# Patient Record
Sex: MALE | Race: WHITE | Employment: STUDENT | URBAN - METROPOLITAN AREA
[De-identification: names, ages, dates, MRNs, and addresses within clinical notes are randomized per-mention and may not be internally consistent; named-entity substitution may affect disease eponyms.]

---
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
## 2021-02-06 ENCOUNTER — OFFICE VISIT (OUTPATIENT)
Dept: FAMILY MEDICINE CLINIC | Facility: CLINIC | Age: 21
End: 2021-02-06
Payer: COMMERCIAL

## 2021-02-06 VITALS
HEIGHT: 73 IN | BODY MASS INDEX: 28.25 KG/M2 | WEIGHT: 213.19 LBS | SYSTOLIC BLOOD PRESSURE: 102 MMHG | DIASTOLIC BLOOD PRESSURE: 70 MMHG | TEMPERATURE: 98 F | RESPIRATION RATE: 20 BRPM | HEART RATE: 96 BPM

## 2021-02-06 DIAGNOSIS — J32.9 RECURRENT SINUSITIS: ICD-10-CM

## 2021-02-06 DIAGNOSIS — J32.9 CHRONIC SINUSITIS, UNSPECIFIED LOCATION: Primary | ICD-10-CM

## 2021-02-06 PROCEDURE — 3074F SYST BP LT 130 MM HG: CPT | Performed by: FAMILY MEDICINE

## 2021-02-06 PROCEDURE — 3008F BODY MASS INDEX DOCD: CPT | Performed by: FAMILY MEDICINE

## 2021-02-06 PROCEDURE — 99213 OFFICE O/P EST LOW 20 MIN: CPT | Performed by: FAMILY MEDICINE

## 2021-02-06 PROCEDURE — 3078F DIAST BP <80 MM HG: CPT | Performed by: FAMILY MEDICINE

## 2021-02-06 RX ORDER — AMOXICILLIN 500 MG/1
1000 CAPSULE ORAL 2 TIMES DAILY
Qty: 40 CAPSULE | Refills: 0 | Status: SHIPPED | OUTPATIENT
Start: 2021-02-06 | End: 2021-02-16

## 2021-02-06 NOTE — PROGRESS NOTES
Subjective   HPI:   This is a 24year old male coming in for had concerns including Nasal Congestion (started a year ago, took zertec worked a little bit not fully, saw a nose speclist and said he might a deveated septum and might need surgery but wants to few days and plan on surgery for sometime May or June depending on college schedule  Return if symptoms worsen or fail to improve.     Trina Lomas MD, 2/6/2021, 10:30 AM

## 2021-05-13 RX ORDER — CETIRIZINE HYDROCHLORIDE 10 MG/1
10 TABLET ORAL DAILY
COMMUNITY

## 2021-05-13 RX ORDER — ACETAMINOPHEN 500 MG
1000 TABLET ORAL ONCE
Status: CANCELLED | OUTPATIENT
Start: 2021-05-13 | End: 2021-05-13

## 2021-06-04 ENCOUNTER — LAB ENCOUNTER (OUTPATIENT)
Dept: LAB | Age: 21
End: 2021-06-04
Attending: OTOLARYNGOLOGY
Payer: COMMERCIAL

## 2021-06-04 DIAGNOSIS — J34.2 DEVIATED NASAL SEPTUM: ICD-10-CM

## 2021-06-04 NOTE — H&P
[address] Patient Status:  Hospital Outpatient Surgery    2000 MRN [id number]   [facility] SURGERY Attending Oneal Bobby MD   Hosp Day # 0 PCP Apurva Ortiz MD     History of Present Il examination is grossly normal.  No focal neurologic deficit.     Laboratory Data:      Impression and Plan:  Patient Active Problem List:     Mild intermittent asthma     Allergic rhinitis     IBS (irritable bowel syndrome)     Chronic seasonal allergic rhi

## 2021-06-07 ENCOUNTER — APPOINTMENT (OUTPATIENT)
Dept: ULTRASOUND IMAGING | Facility: HOSPITAL | Age: 21
End: 2021-06-07
Attending: PEDIATRICS
Payer: COMMERCIAL

## 2021-06-07 ENCOUNTER — HOSPITAL ENCOUNTER (EMERGENCY)
Facility: HOSPITAL | Age: 21
Discharge: HOME | End: 2021-06-07
Attending: PEDIATRICS
Payer: COMMERCIAL

## 2021-06-07 ENCOUNTER — ANESTHESIA EVENT (OUTPATIENT)
Dept: SURGERY | Facility: HOSPITAL | Age: 21
End: 2021-06-07
Payer: COMMERCIAL

## 2021-06-07 ENCOUNTER — ANESTHESIA (OUTPATIENT)
Dept: SURGERY | Facility: HOSPITAL | Age: 21
End: 2021-06-07
Payer: COMMERCIAL

## 2021-06-07 ENCOUNTER — HOSPITAL ENCOUNTER (OUTPATIENT)
Facility: HOSPITAL | Age: 21
Setting detail: HOSPITAL OUTPATIENT SURGERY
Discharge: HOME | End: 2021-06-07
Attending: OTOLARYNGOLOGY | Admitting: OTOLARYNGOLOGY
Payer: COMMERCIAL

## 2021-06-07 VITALS
TEMPERATURE: 99 F | HEIGHT: 72 IN | DIASTOLIC BLOOD PRESSURE: 99 MMHG | RESPIRATION RATE: 18 BRPM | HEART RATE: 100 BPM | SYSTOLIC BLOOD PRESSURE: 136 MMHG | OXYGEN SATURATION: 97 % | WEIGHT: 218 LBS | BODY MASS INDEX: 29.53 KG/M2

## 2021-06-07 VITALS
DIASTOLIC BLOOD PRESSURE: 84 MMHG | SYSTOLIC BLOOD PRESSURE: 136 MMHG | WEIGHT: 218.25 LBS | RESPIRATION RATE: 12 BRPM | TEMPERATURE: 98 F | OXYGEN SATURATION: 97 % | HEART RATE: 95 BPM | BODY MASS INDEX: 29.56 KG/M2 | HEIGHT: 72 IN

## 2021-06-07 DIAGNOSIS — Z98.890 S/P NASAL SEPTOPLASTY: Primary | ICD-10-CM

## 2021-06-07 DIAGNOSIS — J34.2 DEVIATED NASAL SEPTUM: Primary | ICD-10-CM

## 2021-06-07 DIAGNOSIS — M79.661 RIGHT CALF PAIN: ICD-10-CM

## 2021-06-07 PROCEDURE — 99284 EMERGENCY DEPT VISIT MOD MDM: CPT

## 2021-06-07 PROCEDURE — 09CM8ZZ EXTIRPATION OF MATTER FROM NASAL SEPTUM, VIA NATURAL OR ARTIFICIAL OPENING ENDOSCOPIC: ICD-10-PCS | Performed by: OTOLARYNGOLOGY

## 2021-06-07 PROCEDURE — 93971 EXTREMITY STUDY: CPT | Performed by: PEDIATRICS

## 2021-06-07 RX ORDER — ACETAMINOPHEN 500 MG
1000 TABLET ORAL ONCE AS NEEDED
Status: COMPLETED | OUTPATIENT
Start: 2021-06-07 | End: 2021-06-07

## 2021-06-07 RX ORDER — MIDAZOLAM HYDROCHLORIDE 1 MG/ML
1 INJECTION INTRAMUSCULAR; INTRAVENOUS EVERY 5 MIN PRN
Status: DISCONTINUED | OUTPATIENT
Start: 2021-06-07 | End: 2021-06-07

## 2021-06-07 RX ORDER — CEFADROXIL 500 MG/1
500 CAPSULE ORAL 2 TIMES DAILY
Qty: 14 CAPSULE | Refills: 0 | Status: SHIPPED | OUTPATIENT
Start: 2021-06-07 | End: 2021-06-14

## 2021-06-07 RX ORDER — LABETALOL HYDROCHLORIDE 5 MG/ML
5 INJECTION, SOLUTION INTRAVENOUS EVERY 5 MIN PRN
Status: DISCONTINUED | OUTPATIENT
Start: 2021-06-07 | End: 2021-06-07

## 2021-06-07 RX ORDER — HYDROCODONE BITARTRATE AND ACETAMINOPHEN 5; 325 MG/1; MG/1
2 TABLET ORAL AS NEEDED
Status: DISCONTINUED | OUTPATIENT
Start: 2021-06-07 | End: 2021-06-07

## 2021-06-07 RX ORDER — METOCLOPRAMIDE HYDROCHLORIDE 5 MG/ML
10 INJECTION INTRAMUSCULAR; INTRAVENOUS AS NEEDED
Status: DISCONTINUED | OUTPATIENT
Start: 2021-06-07 | End: 2021-06-07

## 2021-06-07 RX ORDER — ROCURONIUM BROMIDE 10 MG/ML
INJECTION, SOLUTION INTRAVENOUS AS NEEDED
Status: DISCONTINUED | OUTPATIENT
Start: 2021-06-07 | End: 2021-06-07 | Stop reason: SURG

## 2021-06-07 RX ORDER — HYDROCODONE BITARTRATE AND ACETAMINOPHEN 5; 325 MG/1; MG/1
1-2 TABLET ORAL EVERY 4 HOURS PRN
Qty: 15 TABLET | Refills: 0 | Status: SHIPPED | OUTPATIENT
Start: 2021-06-07

## 2021-06-07 RX ORDER — ACETAMINOPHEN 500 MG
1000 TABLET ORAL ONCE
Status: ON HOLD | COMMUNITY
End: 2021-06-07

## 2021-06-07 RX ORDER — SODIUM CHLORIDE, SODIUM LACTATE, POTASSIUM CHLORIDE, CALCIUM CHLORIDE 600; 310; 30; 20 MG/100ML; MG/100ML; MG/100ML; MG/100ML
INJECTION, SOLUTION INTRAVENOUS CONTINUOUS
Status: DISCONTINUED | OUTPATIENT
Start: 2021-06-07 | End: 2021-06-07

## 2021-06-07 RX ORDER — HYDROCODONE BITARTRATE AND ACETAMINOPHEN 5; 325 MG/1; MG/1
1 TABLET ORAL AS NEEDED
Status: DISCONTINUED | OUTPATIENT
Start: 2021-06-07 | End: 2021-06-07

## 2021-06-07 RX ORDER — NALOXONE HYDROCHLORIDE 0.4 MG/ML
80 INJECTION, SOLUTION INTRAMUSCULAR; INTRAVENOUS; SUBCUTANEOUS AS NEEDED
Status: DISCONTINUED | OUTPATIENT
Start: 2021-06-07 | End: 2021-06-07

## 2021-06-07 RX ORDER — MEPERIDINE HYDROCHLORIDE 25 MG/ML
12.5 INJECTION INTRAMUSCULAR; INTRAVENOUS; SUBCUTANEOUS AS NEEDED
Status: DISCONTINUED | OUTPATIENT
Start: 2021-06-07 | End: 2021-06-07

## 2021-06-07 RX ORDER — LIDOCAINE HYDROCHLORIDE AND EPINEPHRINE 10; 10 MG/ML; UG/ML
INJECTION, SOLUTION INFILTRATION; PERINEURAL AS NEEDED
Status: DISCONTINUED | OUTPATIENT
Start: 2021-06-07 | End: 2021-06-07 | Stop reason: HOSPADM

## 2021-06-07 RX ORDER — METOCLOPRAMIDE HYDROCHLORIDE 5 MG/ML
INJECTION INTRAMUSCULAR; INTRAVENOUS AS NEEDED
Status: DISCONTINUED | OUTPATIENT
Start: 2021-06-07 | End: 2021-06-07 | Stop reason: SURG

## 2021-06-07 RX ORDER — GLYCOPYRROLATE 0.2 MG/ML
INJECTION, SOLUTION INTRAMUSCULAR; INTRAVENOUS AS NEEDED
Status: DISCONTINUED | OUTPATIENT
Start: 2021-06-07 | End: 2021-06-07 | Stop reason: SURG

## 2021-06-07 RX ORDER — LIDOCAINE HYDROCHLORIDE 10 MG/ML
INJECTION, SOLUTION EPIDURAL; INFILTRATION; INTRACAUDAL; PERINEURAL AS NEEDED
Status: DISCONTINUED | OUTPATIENT
Start: 2021-06-07 | End: 2021-06-07 | Stop reason: SURG

## 2021-06-07 RX ORDER — DEXAMETHASONE SODIUM PHOSPHATE 4 MG/ML
4 VIAL (ML) INJECTION AS NEEDED
Status: DISCONTINUED | OUTPATIENT
Start: 2021-06-07 | End: 2021-06-07

## 2021-06-07 RX ORDER — CEFAZOLIN SODIUM/WATER 2 G/20 ML
2 SYRINGE (ML) INTRAVENOUS ONCE
Status: COMPLETED | OUTPATIENT
Start: 2021-06-07 | End: 2021-06-07

## 2021-06-07 RX ORDER — MIDAZOLAM HYDROCHLORIDE 1 MG/ML
INJECTION INTRAMUSCULAR; INTRAVENOUS AS NEEDED
Status: DISCONTINUED | OUTPATIENT
Start: 2021-06-07 | End: 2021-06-07 | Stop reason: SURG

## 2021-06-07 RX ORDER — DEXAMETHASONE SODIUM PHOSPHATE 4 MG/ML
VIAL (ML) INJECTION AS NEEDED
Status: DISCONTINUED | OUTPATIENT
Start: 2021-06-07 | End: 2021-06-07 | Stop reason: SURG

## 2021-06-07 RX ORDER — ONDANSETRON 2 MG/ML
INJECTION INTRAMUSCULAR; INTRAVENOUS AS NEEDED
Status: DISCONTINUED | OUTPATIENT
Start: 2021-06-07 | End: 2021-06-07 | Stop reason: SURG

## 2021-06-07 RX ORDER — ONDANSETRON 2 MG/ML
4 INJECTION INTRAMUSCULAR; INTRAVENOUS AS NEEDED
Status: DISCONTINUED | OUTPATIENT
Start: 2021-06-07 | End: 2021-06-07

## 2021-06-07 RX ORDER — HYDROMORPHONE HYDROCHLORIDE 1 MG/ML
0.4 INJECTION, SOLUTION INTRAMUSCULAR; INTRAVENOUS; SUBCUTANEOUS EVERY 5 MIN PRN
Status: DISCONTINUED | OUTPATIENT
Start: 2021-06-07 | End: 2021-06-07

## 2021-06-07 RX ORDER — NEOSTIGMINE METHYLSULFATE 1 MG/ML
INJECTION INTRAVENOUS AS NEEDED
Status: DISCONTINUED | OUTPATIENT
Start: 2021-06-07 | End: 2021-06-07 | Stop reason: SURG

## 2021-06-07 RX ADMIN — METOCLOPRAMIDE HYDROCHLORIDE 10 MG: 5 INJECTION INTRAMUSCULAR; INTRAVENOUS at 12:59:00

## 2021-06-07 RX ADMIN — LIDOCAINE HYDROCHLORIDE 50 MG: 10 INJECTION, SOLUTION EPIDURAL; INFILTRATION; INTRACAUDAL; PERINEURAL at 12:15:00

## 2021-06-07 RX ADMIN — MIDAZOLAM HYDROCHLORIDE 3 MG: 1 INJECTION INTRAMUSCULAR; INTRAVENOUS at 12:05:00

## 2021-06-07 RX ADMIN — ROCURONIUM BROMIDE 40 MG: 10 INJECTION, SOLUTION INTRAVENOUS at 12:15:00

## 2021-06-07 RX ADMIN — NEOSTIGMINE METHYLSULFATE 5 MG: 1 INJECTION INTRAVENOUS at 13:00:00

## 2021-06-07 RX ADMIN — CEFAZOLIN SODIUM/WATER 2 G: 2 G/20 ML SYRINGE (ML) INTRAVENOUS at 12:24:00

## 2021-06-07 RX ADMIN — ONDANSETRON 4 MG: 2 INJECTION INTRAMUSCULAR; INTRAVENOUS at 12:59:00

## 2021-06-07 RX ADMIN — DEXAMETHASONE SODIUM PHOSPHATE 8 MG: 4 MG/ML VIAL (ML) INJECTION at 12:20:00

## 2021-06-07 RX ADMIN — GLYCOPYRROLATE 0.9 MG: 0.2 INJECTION, SOLUTION INTRAMUSCULAR; INTRAVENOUS at 13:00:00

## 2021-06-07 NOTE — ANESTHESIA PREPROCEDURE EVALUATION
PRE-OP EVALUATION    Patient Name: Aundrea Chiu    Admit Diagnosis: DEVIATED NASAL SEPTUM AND TURBINATE    Pre-op Diagnosis: DEVIATED NASAL SEPTUM AND TURBINATE    NASAL SEPTOPLASTY,BILATERAL OUT FRACTURE ON INFERIOR TURBINATES, SHAVER SUBMUCOUS RESECTION Location: [address] [place] [address]   • OTHER SURGICAL HISTORY N/A 4/8/2015    Procedure: ESOPHAGOGASTRODUODENOSCOPY (EGD);   Surgeon: Dorothy Tijerina MD;  Location: [address] [place] [address]     Social History    Tobacco Use      Smoking status: Never Smoker      Smokeless tobacco:

## 2021-06-07 NOTE — ANESTHESIA PROCEDURE NOTES
Airway  Date/Time: 6/7/2021 12:16 PM  Urgency: elective    Airway not difficult    General Information and Staff    Patient location during procedure: OR  Anesthesiologist: Daniella Salgado MD    Indications and Patient Condition  Indications for airway man

## 2021-06-07 NOTE — INTERVAL H&P NOTE
Pre-op Diagnosis: DEVIATED NASAL SEPTUM AND TURBINATE    The above referenced H&P was reviewed by Pal Fall MD on 6/7/2021, the patient was examined and no significant changes have occurred in the patient's condition since the H&P was performed.

## 2021-06-07 NOTE — BRIEF OP NOTE
Pre-Operative Diagnosis: DEVIATED NASAL SEPTUM AND TURBINATE     Post-Operative Diagnosis: DEVIATED NASAL SEPTUM AND TURBINATE      Procedure Performed:   NASAL SEPTOPLASTY,BILATERAL OUT FRACTURE ON INFERIOR TURBINATES, SHAVER SUBMUCOUS RESECTION IN INFERI

## 2021-06-07 NOTE — ANESTHESIA POSTPROCEDURE EVALUATION
[address] Patient Status:  Hospital Outpatient Surgery   Age/Gender 24year old male MRN [id number]   Location [address] [place] [address] Attending Aung Palmer MD   Hosp Day # 0 PCP Bibi Godfrey MD       Anesthesia P

## 2021-06-08 NOTE — OPERATIVE REPORT
[address]    PATIENT'S NAME: Marielena Rodriguez   ATTENDING PHYSICIAN: Naomia Heimlich, M.D. OPERATING PHYSICIAN: Naomia Heimlich, M.D.    PATIENT ACCOUNT#:   [de-identified]    LOCATION:  PREOPASCC  PRE ASCC 20 EDWP 10  MEDICAL RECORD #:   [id number]       D therefore carved a small anterior piece of that the septum off to allow it to drop back into the midline. I then sewed it in the midline with a 4-0 Vicryl suture. The septal mucosal edges were then closed with 4-0 chromic suture and then a septal staple.

## 2021-06-08 NOTE — ED PROVIDER NOTES
Patient Seen in: [facility] Emergency Department      History   Patient presents with:  Swelling Edema  Postop/Procedure Problem    Stated Complaint: rle swelling and pain, nasal surgery today    HPI/Subjective:   HPI    52-year-old male postop day systems are as noted in HPI. Constitutional and vital signs reviewed. All other systems reviewed and negative except as noted above.     Physical Exam     ED Triage Vitals [06/07/21 1919]   BP (!) 136/99   Pulse 100   Resp 18   Temp 98.7 °F (37.1 °C) Bao Rebolledo MD on 6/07/2021 at 8:03 PM     Finalized by (CST): Bao Rebolledo MD on 6/07/2021 at 8:03 PM         Labs:  Personally reviewed all labs ordered.   Clinically significant lab abnormalities: none    Medications administered:  Medications - No encounter diagnosis)  Right calf pain     Disposition:  Discharge  6/7/2021  8:10 pm    Follow-up:  [facility] Emergency Department  [place]  [address]  [phone]    As needed, If symptoms worsen          Medication

## 2021-06-08 NOTE — ED INITIAL ASSESSMENT (HPI)
S/p nasal septum surgery today. Reports after surgery he has swelling, pain and tightness to R calf.

## 2021-07-02 ENCOUNTER — IMMUNIZATION (OUTPATIENT)
Dept: LAB | Facility: HOSPITAL | Age: 21
End: 2021-07-02
Attending: EMERGENCY MEDICINE
Payer: COMMERCIAL

## 2021-07-02 DIAGNOSIS — Z23 NEED FOR VACCINATION: Primary | ICD-10-CM

## 2021-07-02 PROCEDURE — 0001A SARSCOV2 VAC 30MCG/0.3ML IM: CPT

## 2021-07-23 ENCOUNTER — IMMUNIZATION (OUTPATIENT)
Dept: LAB | Facility: HOSPITAL | Age: 21
End: 2021-07-23
Attending: EMERGENCY MEDICINE
Payer: COMMERCIAL

## 2021-07-23 DIAGNOSIS — Z23 NEED FOR VACCINATION: Primary | ICD-10-CM

## 2021-07-23 PROCEDURE — 0002A SARSCOV2 VAC 30MCG/0.3ML IM: CPT

## 2022-01-03 ENCOUNTER — TELEPHONE (OUTPATIENT)
Dept: FAMILY MEDICINE CLINIC | Facility: CLINIC | Age: 22
End: 2022-01-03

## 2022-01-03 DIAGNOSIS — Z11.52 ENCOUNTER FOR SCREENING FOR COVID-19: Primary | ICD-10-CM

## 2022-01-03 NOTE — TELEPHONE ENCOUNTER
Per mom, patient supposed to start semester 1/24. Needs booster and test 48 hours prior to returning. Order placed per protocol.

## 2022-01-03 NOTE — TELEPHONE ENCOUNTER
Mom (on hipaa) has questions about Covid protocol for patient returning to school, asking to speak with a nurse

## 2022-01-22 ENCOUNTER — IMMUNIZATION (OUTPATIENT)
Dept: LAB | Facility: HOSPITAL | Age: 22
End: 2022-01-22
Attending: EMERGENCY MEDICINE
Payer: COMMERCIAL

## 2022-01-22 DIAGNOSIS — Z23 NEED FOR VACCINATION: Primary | ICD-10-CM

## 2022-01-22 PROCEDURE — 0004A SARSCOV2 VAC 30MCG/0.3ML IM: CPT | Performed by: NURSE PRACTITIONER

## 2023-10-09 ENCOUNTER — TELEPHONE (OUTPATIENT)
Dept: FAMILY MEDICINE CLINIC | Facility: CLINIC | Age: 23
End: 2023-10-09

## 2023-10-09 DIAGNOSIS — Z13.228 SCREENING FOR METABOLIC DISORDER: ICD-10-CM

## 2023-10-09 DIAGNOSIS — Z13.0 SCREENING FOR BLOOD DISEASE: Primary | ICD-10-CM

## 2023-10-09 DIAGNOSIS — Z13.29 SCREENING FOR THYROID DISORDER: ICD-10-CM

## 2023-10-09 NOTE — TELEPHONE ENCOUNTER
.Please enter lab orders for the patient's upcoming physical appointment. Physical scheduled: Your appointments       Date & Time Appointment Department [facility])    Nov 01, 2023  9:00 AM CDT Physical - Established with Lux Gomez NP [facility], [address] [place] [address], [place] (Aiden Duran)              Robinson Funk [address] [id number]           Preferred lab: [facility]     The patient has been notified to complete fasting labs prior to their physical appointment.

## 2023-10-29 LAB
ABSOLUTE BASOPHILS: 28 CELLS/UL (ref 0–200)
ABSOLUTE EOSINOPHILS: 99 CELLS/UL (ref 15–500)
ABSOLUTE LYMPHOCYTES: 2371 CELLS/UL (ref 850–3900)
ABSOLUTE MONOCYTES: 369 CELLS/UL (ref 200–950)
ABSOLUTE NEUTROPHILS: 2635 CELLS/UL (ref 1500–7800)
ALBUMIN/GLOBULIN RATIO: 1.7 (CALC) (ref 1–2.5)
ALBUMIN: 4.4 G/DL (ref 3.6–5.1)
ALKALINE PHOSPHATASE: 72 U/L (ref 36–130)
ALT: 24 U/L (ref 9–46)
AST: 17 U/L (ref 10–40)
BASOPHILS: 0.5 %
BILIRUBIN, TOTAL: 0.7 MG/DL (ref 0.2–1.2)
BUN: 11 MG/DL (ref 7–25)
CALCIUM: 10 MG/DL (ref 8.6–10.3)
CARBON DIOXIDE: 26 MMOL/L (ref 20–32)
CHLORIDE: 108 MMOL/L (ref 98–110)
CREATININE: 0.74 MG/DL (ref 0.6–1.24)
EGFR: 132 ML/MIN/1.73M2
EOSINOPHILS: 1.8 %
GLOBULIN: 2.6 G/DL (CALC) (ref 1.9–3.7)
GLUCOSE: 88 MG/DL (ref 65–99)
HEMATOCRIT: 48.7 % (ref 38.5–50)
HEMOGLOBIN: 16.3 G/DL (ref 13.2–17.1)
LYMPHOCYTES: 43.1 %
MCH: 28.7 PG (ref 27–33)
MCHC: 33.5 G/DL (ref 32–36)
MCV: 85.9 FL (ref 80–100)
MONOCYTES: 6.7 %
MPV: 10.4 FL (ref 7.5–12.5)
NEUTROPHILS: 47.9 %
PLATELET COUNT: 263 THOUSAND/UL (ref 140–400)
POTASSIUM: 4.5 MMOL/L (ref 3.5–5.3)
PROTEIN, TOTAL: 7 G/DL (ref 6.1–8.1)
RDW: 12.5 % (ref 11–15)
RED BLOOD CELL COUNT: 5.67 MILLION/UL (ref 4.2–5.8)
SODIUM: 140 MMOL/L (ref 135–146)
TSH W/REFLEX TO FT4: 1.62 MIU/L (ref 0.4–4.5)
WHITE BLOOD CELL COUNT: 5.5 THOUSAND/UL (ref 3.8–10.8)

## 2023-11-01 ENCOUNTER — OFFICE VISIT (OUTPATIENT)
Dept: FAMILY MEDICINE CLINIC | Facility: CLINIC | Age: 23
End: 2023-11-01
Payer: COMMERCIAL

## 2023-11-01 VITALS
SYSTOLIC BLOOD PRESSURE: 124 MMHG | HEART RATE: 97 BPM | BODY MASS INDEX: 29.91 KG/M2 | WEIGHT: 216 LBS | RESPIRATION RATE: 16 BRPM | TEMPERATURE: 97 F | OXYGEN SATURATION: 98 % | DIASTOLIC BLOOD PRESSURE: 80 MMHG | HEIGHT: 71.26 IN

## 2023-11-01 DIAGNOSIS — J45.20 MILD INTERMITTENT ASTHMA WITHOUT COMPLICATION: ICD-10-CM

## 2023-11-01 DIAGNOSIS — Z00.00 ROUTINE PHYSICAL EXAMINATION: Primary | ICD-10-CM

## 2023-11-01 PROCEDURE — 3079F DIAST BP 80-89 MM HG: CPT | Performed by: NURSE PRACTITIONER

## 2023-11-01 PROCEDURE — 90715 TDAP VACCINE 7 YRS/> IM: CPT | Performed by: NURSE PRACTITIONER

## 2023-11-01 PROCEDURE — 90471 IMMUNIZATION ADMIN: CPT | Performed by: NURSE PRACTITIONER

## 2023-11-01 PROCEDURE — 3074F SYST BP LT 130 MM HG: CPT | Performed by: NURSE PRACTITIONER

## 2023-11-01 PROCEDURE — 3008F BODY MASS INDEX DOCD: CPT | Performed by: NURSE PRACTITIONER

## 2023-11-01 PROCEDURE — 99395 PREV VISIT EST AGE 18-39: CPT | Performed by: NURSE PRACTITIONER

## 2023-11-01 RX ORDER — ALBUTEROL SULFATE 90 UG/1
2 AEROSOL, METERED RESPIRATORY (INHALATION) EVERY 4 HOURS PRN
Qty: 8 G | Refills: 0 | Status: SHIPPED | OUTPATIENT
Start: 2023-11-01

## 2024-09-01 ENCOUNTER — APPOINTMENT (OUTPATIENT)
Dept: GENERAL RADIOLOGY | Age: 24
End: 2024-09-01
Attending: NURSE PRACTITIONER
Payer: COMMERCIAL

## 2024-09-01 ENCOUNTER — HOSPITAL ENCOUNTER (OUTPATIENT)
Age: 24
Discharge: HOME | End: 2024-09-01
Payer: COMMERCIAL

## 2024-09-01 VITALS
BODY MASS INDEX: 29.8 KG/M2 | OXYGEN SATURATION: 98 % | RESPIRATION RATE: 19 BRPM | DIASTOLIC BLOOD PRESSURE: 81 MMHG | HEART RATE: 80 BPM | TEMPERATURE: 99 F | HEIGHT: 72 IN | WEIGHT: 220 LBS | SYSTOLIC BLOOD PRESSURE: 132 MMHG

## 2024-09-01 DIAGNOSIS — S90.112A CONTUSION OF LEFT GREAT TOE WITHOUT DAMAGE TO NAIL, INITIAL ENCOUNTER: Primary | ICD-10-CM

## 2024-09-01 PROCEDURE — 73660 X-RAY EXAM OF TOE(S): CPT | Performed by: NURSE PRACTITIONER

## 2024-09-01 PROCEDURE — 99203 OFFICE O/P NEW LOW 30 MIN: CPT

## 2024-09-01 PROCEDURE — 99213 OFFICE O/P EST LOW 20 MIN: CPT

## 2024-09-01 NOTE — DISCHARGE INSTRUCTIONS
Ezio tape with post op shoe.  Tylenol and Motrin for pain.  Ice and elevate foot.  Follow up with podiatry if pain persists.

## 2024-09-01 NOTE — ED PROVIDER NOTES
Patient Seen in: [facility]      History     Chief Complaint   Patient presents with    Foot Pain     Stated Complaint: left foot pain, printer fell on foot    Subjective:   HPI  23-year-old male presents complaining of pain to his great toe after he dropped a printer on his foot just prior to arrival.  He is ambulatory without assistance.    Objective:   Past Medical History:    Abnormal involuntary movements(781.0)    Back pain    Belching    Burp frequently some days more than others    Bleeding nose    Calculus of kidney    Diarrhea, unspecified    None    Frequent use of laxatives    Use it as needed    Hemorrhoids    Still deal with them form time to time    Hoarseness, chronic    Couch up a lot of mucus in the morning    Indigestion    Not sure when it happens    Irregular bowel habits    None    Skin blushing/flushing    Sputum production    Have been coughing and hacking up mucus frequently    Stress    Visual impairment    glasses    Wears glasses              Past Surgical History:   Procedure Laterality Date    Colonoscopy N/A 4/8/2015    Procedure: COLONOSCOPY;  Surgeon: Dariusz Rivera MD;  Location:  ENDOSCOPY    Other surgical history N/A 4/8/2015    Procedure: ESOPHAGOGASTRODUODENOSCOPY (EGD);  Surgeon: Dariusz Rivera MD;  Location:  ENDOSCOPY                Social History     Socioeconomic History    Marital status: Single   Occupational History    Occupation: student   Tobacco Use    Smoking status: Never    Smokeless tobacco: Never   Vaping Use    Vaping status: Never Used   Substance and Sexual Activity    Alcohol use: No    Drug use: No   Other Topics Concern    Caffeine Concern No    Exercise Yes     Comment: every day/ phys ed and golf    Bike Helmet No    Seat Belt Yes              Review of Systems   All other systems reviewed and are negative.      Positive for stated Chief Complaint: Foot Pain    Other systems are as noted in HPI.  Constitutional and vital signs  reviewed.      All other systems reviewed and negative except as noted above.    Physical Exam     ED Triage Vitals [09/01/24 1458]   /81   Pulse 80   Resp 19   Temp 98.5 °F (36.9 °C)   Temp src Temporal   SpO2 98 %   O2 Device None (Room air)       Current Vitals:   Vital Signs  BP: 132/81  Pulse: 80  Resp: 19  Temp: 98.5 °F (36.9 °C)  Temp src: Temporal    Oxygen Therapy  SpO2: 98 %  O2 Device: None (Room air)            Physical Exam  Vitals and nursing note reviewed.   Constitutional:       General: He is not in acute distress.     Appearance: He is well-developed. He is not ill-appearing or toxic-appearing.   Cardiovascular:      Rate and Rhythm: Normal rate.   Pulmonary:      Effort: Pulmonary effort is normal.   Musculoskeletal:        Feet:       Comments: Swelling with redness/ecchymosis.  DP 2+.   Skin:     General: Skin is warm and dry.   Neurological:      Mental Status: He is alert and oriented to person, place, and time.             ED Course   Labs Reviewed - No data to display          XR TOE(S) (MIN 2 VIEWS), LEFT 1ST (CPT=73660)    Result Date: 9/1/2024            PROCEDURE:  XR TOE(S) (MIN 2 VIEWS), LEFT 1ST (CPT=73660)  TECHNIQUE:  Three views were obtained.  COMPARISON:  None.  INDICATIONS:  left foot pain, printer fell on foot  PATIENT STATED HISTORY: (As transcribed by Technologist)  Patient states that a printer fell on his left foot while he was moving boxes. Patient states that he injured his left 1st toe and there is visible swelling and bruising. Patient has difficulty bending his toes and walking on his left foot.    FINDINGS: Minimal periosteal bone along the plantar aspect of the distal phalanx of the great toe is noted.  This is of questionable significance.  If patient has pain at this specific location possibility of a subtle fracture is of consideration.  Otherwise, unremarkable radiographs of the left great toe.    LOCATION:  [facility]   Dictated by (CST): David Bejarano MD  on 9/01/2024 at 3:41 PM     Finalized by (CST): David Bejarano MD on 9/01/2024 at 3:42 PM             MDM       Medical Decision Making  23-year-old male presents complaining of pain to his great toe after he dropped a printer on his foot just prior to arrival.  He is ambulatory without assistance.    Pertinent Labs & Imaging studies reviewed. (See chart for details).  Patient coming in with foot pain.   Differential diagnosis includes but not limited to foot pain, foot contusion, foot fracture, toe fracture  Radiology Minimal periosteal bone along the plantar aspect of the distal phalanx of the great toe is noted.  This is of questionable significance.  If patient has pain at this specific location possibility of a subtle fracture is of consideration.  Otherwise, unremarkable radiographs of the left great toe.  Will treat for toe contusion.  Will discharge on shaun tape applied with postop shoe provided and patient instructed on podiatry follow-up. Patient/Parent is comfortable with this plan.    Overall Pt looks good. Non-toxic, well-hydrated and in no respiratory distress. Vital signs are reassuring. Exam is reassuring.  No tenderness to the distal aspect of the great toe.  I do not believe pt requires and additional diagnostic studies or intervention. I believe pt can be discharged home to continue evaluation as an outpatient. Follow-up provider given. Discharge instructions given and reviewed. Return for any problems. All understand and agree with the plan.        Problems Addressed:  Contusion of left great toe without damage to nail, initial encounter: acute illness or injury        Disposition and Plan     Clinical Impression:  1. Contusion of left great toe without damage to nail, initial encounter         Disposition:  Discharge  9/1/2024  3:34 pm    Follow-up:  Anthony Finn DPM  [address] [place] [id number]  [phone]    Call             Medications  Prescribed:  Discharge Medication List as of 9/1/2024  3:37 PM

## (undated) DEVICE — SCD SLEEVE KNEE HI BLEND

## (undated) DEVICE — STERILE POLYISOPRENE POWDER-FREE SURGICAL GLOVES: Brand: PROTEXIS

## (undated) DEVICE — ENTACT SEPTAL STAPLER 3 PACK: Brand: ENT SINUS

## (undated) DEVICE — SINU FOAM: Brand: SINU-FOAM

## (undated) DEVICE — SUTURE CHROMIC GUT 4-0 RB-1

## (undated) DEVICE — BLADE 1882040 5PK INFERIOR TURB 2MM

## (undated) DEVICE — SINUS CDS: Brand: MEDLINE INDUSTRIES, INC.

## (undated) DEVICE — SOL H2O 1000ML BTL

## (undated) NOTE — ED AVS SNAPSHOT
Orlando Alberta   MRN: [id number]    Department:  [facility] Emergency Department   Date of Visit:  10/4/2018           Disclosure     Insurance plans vary and the physician(s) referred by the ER may not be covered by your plan.  Please contact your i tell this physician (or your personal doctor if your instructions are to return to your personal doctor) about any new or lasting problems. The primary care or specialist physician will see patients referred from the [facility] Emergency Department.  Diandra Thompson

## (undated) NOTE — LETTER
Patient Name: Abigail Steen  : 2000  MRN: [id number]  Patient Address: [address] [place] [address] [address] [facility] [address]      Coronavirus Disease 2019 (COVID-19)     [facility] is committed to the safety and well-being of our patients, members, carefully. If your symptoms get worse, call your healthcare provider immediately. 3. Get rest and stay hydrated.    4. If you have a medical appointment, call the healthcare provider ahead of time and tell them that you have or may have COVID-19.  5. For m of fever-reducing medications; and  · Improvement in respiratory symptoms (e.g., cough, shortness of breath); and  · At least 10 days have passed since symptoms first appeared OR if asymptomatic patient or date of symptom onset is unclear then use 10 days donors must:    · Have had a confirmed diagnosis of COVID-19  · Be symptom-free for at least 14 days*    *Some people will be required to have a repeat COVID-19 test in order to be eligible to donate.  If you’re instructed by Carlota that a repeat test is r random. Researchers are trying to identify similarities between people with a Post-COVID condition to better understand if there are risk factors. How do I prevent a Post-COVID condition?   The best way to prevent the long-term symptoms of COVID-19 is

## (undated) NOTE — ED AVS SNAPSHOT
Paolareinaldo Alberta   MRN: [id number]    Department:  [facility] Emergency Department   Date of Visit:  5/5/2019           Disclosure     Insurance plans vary and the physician(s) referred by the ER may not be covered by your plan.  Please contact your in tell this physician (or your personal doctor if your instructions are to return to your personal doctor) about any new or lasting problems. The primary care or specialist physician will see patients referred from the [facility] Emergency Department.  Jacinto Ortiz

## (undated) NOTE — LETTER
ASTHMA ACTION PLAN for Blake Casarez     : 2000     Date: 2017  Provider:  Giacomo Lynn MD  Phone for doctor or clinic: [facility], [address] [facility], [address]  [place] [address]

## (undated) NOTE — LETTER
ASTHMA ACTION PLAN for Levy Koyanagi     : 2000     Date: 10/12/2018  Provider:  Vielka Villanueva MD  Phone for doctor or clinic: [address] [facility], [address] [facility], [address] ,[address]  [address] [place] [address]    A

## (undated) NOTE — LETTER
Name:  Tiffanie Finley School Year:  10th Grade Class: Student ID No.:   Address:  [address] [place] [address] Phone:  [phone] (home) [phone] (work) :  14year old   Name Relationship [address] Work Phone Home Phone Mobile Phone blood pressure, murmur, high cholesterol, heart infection, Kawasaki disease, other)     9. Has a doctor ever ordered a test for your heart? 10. Do you get lightheaded or feel more short of breath than expected during exercise?      11. Have you ever ha 27. Have you ever used an inhaler or taken asthma medication? 29. Is there anyone in your family who has asthma? 34. Were you born without or are you missing a kidney, eye, testicle, spleen, or any other organ? 30.  Do you have a groin pain or a I hereby state that, to the best of my knowledge, my answers to the above questions are complete and correct.  7/24/2017    Signature of athlete: _____________________________________     Signature of parent/guardian: _______________________________________ *effective January 2003, the [employer] of Directors approved a recommendation, consistent with the [facility] [employer], that allows [employer] or Advanced Nurse Practitioners to sign off on physicals.    [facility] Substance Testing Policy Consent t Medicine, & [address] [place] [address] [employer]. Permission granted to reprint for noncommercial, educational purposes with acknowledgment.    [id number]

## (undated) NOTE — LETTER
Date: 2/18/2020    Patient Name: Afua Feng          To Whom it may concern: This letter has been written at the patient's request. The above patient was seen at the [facility] for treatment of a medical condition.     This patient should

## (undated) NOTE — LETTER
ASTHMA ACTION PLAN for Dania Mg     : 2000     Date: 2017  Provider:  Trina Lomas MD  Phone for doctor or clinic: [facility], [address] [facility], [address]  [address] [place] [address]

## (undated) NOTE — LETTER
[facility] [address] of Child Health Examination       Student's Name  Jefpawan Gonsalo Birth Date Title                           Date     Signature HEALTH HISTORY          TO BE COMPLETED AND SIGNED BY PARENT/GUARDIAN AND VERIFIED BY HEALTH CARE PROVIDER    ALLERGIES  (Food, drug, insect, other)  Seasonal MEDICATION  (List all prescribed or taken on a regular basis.)    Current Outpatient Medications: Other concerns? (crossed eye, drooping lids, squinting, difficulty reading) Dental:  ____Braces    ____Bridge    ____Plate    ____Other  Other concerns? Ear/Hearing problems?    Yes   No  Information may be shared with appropriate personnel for health / LAB TESTS (Recommended) Date Results  Date Results   Hemoglobin or Hematocrit   Sickle Cell  (when indicated)     Urinalysis   Developmental Screening Tool     SYSTEM REVIEW Normal Comments/Follow-up/Needs  Normal Comments/Follow-up/Needs   Skin Yes  Endoc Signature                                                                                Date  8/20/2019   Address/Phone  [facility], [address] [facility], [address] [facility],  Bonnie Vasquez   Rev 11/15